# Patient Record
Sex: MALE | Race: WHITE | Employment: UNEMPLOYED | URBAN - METROPOLITAN AREA
[De-identification: names, ages, dates, MRNs, and addresses within clinical notes are randomized per-mention and may not be internally consistent; named-entity substitution may affect disease eponyms.]

---

## 2018-05-13 ENCOUNTER — HOSPITAL ENCOUNTER (OUTPATIENT)
Age: 33
Setting detail: OBSERVATION
LOS: 1 days | Discharge: HOME | End: 2018-05-15
Attending: EMERGENCY MEDICINE | Admitting: INTERNAL MEDICINE
Payer: SELF-PAY

## 2018-05-13 DIAGNOSIS — K92.2 UGIB (UPPER GASTROINTESTINAL BLEED): Primary | ICD-10-CM

## 2018-05-13 DIAGNOSIS — K92.1 MELENA: ICD-10-CM

## 2018-05-13 LAB
ALBUMIN SERPL-MCNC: 3.8 G/DL (ref 3.5–5)
ALBUMIN/GLOB SERPL: 1.3 {RATIO} (ref 1.2–3.5)
ALP SERPL-CCNC: 66 U/L (ref 50–136)
ALT SERPL-CCNC: 60 U/L (ref 12–65)
ANION GAP SERPL CALC-SCNC: 13 MMOL/L (ref 7–16)
AST SERPL-CCNC: 22 U/L (ref 15–37)
BASOPHILS # BLD: 0 K/UL (ref 0–0.2)
BASOPHILS NFR BLD: 0 % (ref 0–2)
BILIRUB SERPL-MCNC: 0.5 MG/DL (ref 0.2–1.1)
BUN SERPL-MCNC: 54 MG/DL (ref 6–23)
CALCIUM SERPL-MCNC: 8.4 MG/DL (ref 8.3–10.4)
CHLORIDE SERPL-SCNC: 103 MMOL/L (ref 98–107)
CO2 SERPL-SCNC: 25 MMOL/L (ref 21–32)
CREAT SERPL-MCNC: 1.15 MG/DL (ref 0.8–1.5)
DIFFERENTIAL METHOD BLD: ABNORMAL
EOSINOPHIL # BLD: 0.2 K/UL (ref 0–0.8)
EOSINOPHIL NFR BLD: 2 % (ref 0.5–7.8)
ERYTHROCYTE [DISTWIDTH] IN BLOOD BY AUTOMATED COUNT: 12 % (ref 11.9–14.6)
GLOBULIN SER CALC-MCNC: 3 G/DL (ref 2.3–3.5)
GLUCOSE SERPL-MCNC: 144 MG/DL (ref 65–100)
HCT VFR BLD AUTO: 34.2 % (ref 41.1–50.3)
HGB BLD-MCNC: 11.9 G/DL (ref 13.6–17.2)
IMM GRANULOCYTES # BLD: 0 K/UL (ref 0–0.5)
IMM GRANULOCYTES NFR BLD AUTO: 0 % (ref 0–5)
INR PPP: 1
LIPASE SERPL-CCNC: 81 U/L (ref 73–393)
LYMPHOCYTES # BLD: 3.3 K/UL (ref 0.5–4.6)
LYMPHOCYTES NFR BLD: 30 % (ref 13–44)
MCH RBC QN AUTO: 30 PG (ref 26.1–32.9)
MCHC RBC AUTO-ENTMCNC: 34.8 G/DL (ref 31.4–35)
MCV RBC AUTO: 86.1 FL (ref 79.6–97.8)
MONOCYTES # BLD: 0.6 K/UL (ref 0.1–1.3)
MONOCYTES NFR BLD: 5 % (ref 4–12)
NEUTS SEG # BLD: 6.8 K/UL (ref 1.7–8.2)
NEUTS SEG NFR BLD: 63 % (ref 43–78)
PLATELET # BLD AUTO: 367 K/UL (ref 150–450)
PMV BLD AUTO: 9.7 FL (ref 10.8–14.1)
POTASSIUM SERPL-SCNC: 3.8 MMOL/L (ref 3.5–5.1)
PROT SERPL-MCNC: 6.8 G/DL (ref 6.3–8.2)
PROTHROMBIN TIME: 13.2 SEC (ref 11.5–14.5)
RBC # BLD AUTO: 3.97 M/UL (ref 4.23–5.67)
SODIUM SERPL-SCNC: 141 MMOL/L (ref 136–145)
WBC # BLD AUTO: 10.9 K/UL (ref 4.3–11.1)

## 2018-05-13 PROCEDURE — 85025 COMPLETE CBC W/AUTO DIFF WBC: CPT | Performed by: EMERGENCY MEDICINE

## 2018-05-13 PROCEDURE — 80053 COMPREHEN METABOLIC PANEL: CPT | Performed by: EMERGENCY MEDICINE

## 2018-05-13 PROCEDURE — 99284 EMERGENCY DEPT VISIT MOD MDM: CPT | Performed by: EMERGENCY MEDICINE

## 2018-05-13 PROCEDURE — 85610 PROTHROMBIN TIME: CPT | Performed by: EMERGENCY MEDICINE

## 2018-05-13 PROCEDURE — 96361 HYDRATE IV INFUSION ADD-ON: CPT | Performed by: EMERGENCY MEDICINE

## 2018-05-13 PROCEDURE — 96375 TX/PRO/DX INJ NEW DRUG ADDON: CPT | Performed by: EMERGENCY MEDICINE

## 2018-05-13 PROCEDURE — 86900 BLOOD TYPING SEROLOGIC ABO: CPT | Performed by: EMERGENCY MEDICINE

## 2018-05-13 PROCEDURE — 74011250636 HC RX REV CODE- 250/636: Performed by: EMERGENCY MEDICINE

## 2018-05-13 PROCEDURE — 74011000250 HC RX REV CODE- 250: Performed by: EMERGENCY MEDICINE

## 2018-05-13 PROCEDURE — C9113 INJ PANTOPRAZOLE SODIUM, VIA: HCPCS | Performed by: EMERGENCY MEDICINE

## 2018-05-13 PROCEDURE — 83690 ASSAY OF LIPASE: CPT | Performed by: EMERGENCY MEDICINE

## 2018-05-13 PROCEDURE — 96374 THER/PROPH/DIAG INJ IV PUSH: CPT | Performed by: EMERGENCY MEDICINE

## 2018-05-13 RX ORDER — SODIUM CHLORIDE 0.9 % (FLUSH) 0.9 %
5-10 SYRINGE (ML) INJECTION EVERY 8 HOURS
Status: DISCONTINUED | OUTPATIENT
Start: 2018-05-13 | End: 2018-05-15 | Stop reason: HOSPADM

## 2018-05-13 RX ORDER — ONDANSETRON 2 MG/ML
4 INJECTION INTRAMUSCULAR; INTRAVENOUS
Status: COMPLETED | OUTPATIENT
Start: 2018-05-13 | End: 2018-05-13

## 2018-05-13 RX ORDER — SODIUM CHLORIDE 9 MG/ML
250 INJECTION, SOLUTION INTRAVENOUS AS NEEDED
Status: DISCONTINUED | OUTPATIENT
Start: 2018-05-13 | End: 2018-05-14

## 2018-05-13 RX ORDER — PAROXETINE 30 MG/1
30 TABLET, FILM COATED ORAL DAILY
COMMUNITY

## 2018-05-13 RX ORDER — SODIUM CHLORIDE 0.9 % (FLUSH) 0.9 %
5-10 SYRINGE (ML) INJECTION AS NEEDED
Status: DISCONTINUED | OUTPATIENT
Start: 2018-05-13 | End: 2018-05-15 | Stop reason: HOSPADM

## 2018-05-13 RX ADMIN — ONDANSETRON 4 MG: 2 INJECTION INTRAMUSCULAR; INTRAVENOUS at 23:16

## 2018-05-13 RX ADMIN — SODIUM CHLORIDE 1000 ML: 900 INJECTION, SOLUTION INTRAVENOUS at 23:16

## 2018-05-13 RX ADMIN — SODIUM CHLORIDE 40 MG: 9 INJECTION, SOLUTION INTRAMUSCULAR; INTRAVENOUS; SUBCUTANEOUS at 23:40

## 2018-05-13 NOTE — IP AVS SNAPSHOT
[address] 
[phone] Patient: Karie Dolan MRN: [id number] About your hospitalization You were admitted on:  May 14, 2018 You last received care in the:  [address] [place] [address] You were discharged on:  May 15, 2018 Why you were hospitalized Your primary diagnosis was:  Upper Gi Bleed Your diagnoses also included:  Anxiety, Add (Attention Deficit Disorder), Epigastric Abdominal Pain, Nausea And Vomiting Follow-up Information Follow up With Details Comments Contact Info Zac Skinner MD On 5/18/2018 12:30 [address] [facility] AT [address] [address] 
[phone] Fernando Barriga MD  OFFICE WILL CALL YOU FOR A FOLLOW UP EGD FOR 2-3 MONTHS 200 [address] [facility] [address] [facility] [address] 
[phone] Discharge Orders Procedure Order Date Status Priority Quantity Spec Type Associated Dx DISCHARGE INSTRUCTIONS 05/15/18 1058 Normal Routine 1 Comments: If worsened, call your doctor or return to hospital.  
When follow up with your doctor, make sure that your doctor is aware of this admission and review hospital record and follow up on lab results for continuity of care. Also, review your medications with your doctor for possible need of adjustment or refills. A check sarah indicates which time of day the medication should be taken. My Medications START taking these medications Instructions Each Dose to Equal  
 Morning Noon Evening Bedtime  
 pantoprazole 40 mg tablet Commonly known as:  PROTONIX Your next dose is:  TOMORROW Take 1 Tab by mouth daily for 30 days. 40 mg CONTINUE taking these medications Instructions Each Dose to Equal  
 Morning Noon Evening Bedtime PAXIL 30 mg tablet Generic drug:  PARoxetine Your next dose is:  TODAY Take 30 mg by mouth daily. 30 mg  
    
   
   
   
  
  
 VYVANSE 40 mg capsule Generic drug:  Lisdexamfetamine Your next dose is:  AS USUAL Take 40 mg by mouth daily. 40 mg Where to Get Your Medications Information on where to get these meds will be given to you by the nurse or doctor. ! Ask your nurse or doctor about these medications  
  pantoprazole 40 mg tablet Discharge Instructions DISCHARGE SUMMARY from Nurse The following personal items are in your possession at time of discharge: 
 
Dental Appliances: None Home Medications: None Jewelry: None Clothing: None Other Valuables: Cell Phone PATIENT INSTRUCTIONS: 
 
After general anesthesia or intravenous sedation, for 24 hours or while taking prescription Narcotics: · Limit your activities · Do not drive and operate hazardous machinery · Do not make important personal or business decisions · Do  not drink alcoholic beverages · If you have not urinated within 8 hours after discharge, please contact your surgeon on call. Report the following to your surgeon: 
· Excessive pain, swelling, redness or odor of or around the surgical area · Temperature over 100.5 · Nausea and vomiting lasting longer than 4 hours or if unable to take medications · Any signs of decreased circulation or nerve impairment to extremity: change in color, persistent  numbness, tingling, coldness or increase pain · Any questions What to do at Home: 
Recommended activity: Activity as tolerated, per MD 
 
If you experience any of the following symptoms fever>101, pain unrelieved with medication, nausea/vomiting, shortness of breath, dizziness/fainting, chest pain. , please follow up with your doctor. *  Please give a list of your current medications to your Primary Care Provider.  
 
*  Please update this list whenever your medications are discontinued, doses are 
 changed, or new medications (including over-the-counter products) are added. *  Please carry medication information at all times in case of emergency situations. These are general instructions for a healthy lifestyle: No smoking/ No tobacco products/ Avoid exposure to second hand smoke Surgeon General's Warning:  Quitting smoking now greatly reduces serious risk to your health. Obesity, smoking, and sedentary lifestyle greatly increases your risk for illness A healthy diet, regular physical exercise & weight monitoring are important for maintaining a healthy lifestyle You may be retaining fluid if you have a history of heart failure or if you experience any of the following symptoms:  Weight gain of 3 pounds or more overnight or 5 pounds in a week, increased swelling in our hands or feet or shortness of breath while lying flat in bed. Please call your doctor as soon as you notice any of these symptoms; do not wait until your next office visit. Recognize signs and symptoms of STROKE: 
 
F-face looks uneven A-arms unable to move or move unevenly S-speech slurred or non-existent T-time-call 911 as soon as signs and symptoms begin-DO NOT go Back to bed or wait to see if you get better-TIME IS BRAIN. Warning Signs of HEART ATTACK Call 911 if you have these symptoms: 
? Chest discomfort. Most heart attacks involve discomfort in the center of the chest that lasts more than a few minutes, or that goes away and comes back. It can feel like uncomfortable pressure, squeezing, fullness, or pain. ? Discomfort in other areas of the upper body. Symptoms can include pain or discomfort in one or both arms, the back, neck, jaw, or stomach. ? Shortness of breath with or without chest discomfort. ? Other signs may include breaking out in a cold sweat, nausea, or lightheadedness. Don't wait more than five minutes to call [address]!  Fast action can save your life. Calling 911 is almost always the fastest way to get lifesaving treatment. Emergency Medical Services staff can begin treatment when they arrive  up to an hour sooner than if someone gets to the hospital by car. The discharge information has been reviewed with the patient. The patient verbalized understanding. Discharge medications reviewed with the patient and appropriate educational materials and side effects teaching were provided. Upper GI Endoscopy: Before Your Procedure What is an upper GI endoscopy? An upper gastrointestinal (or GI) endoscopy is a test that allows your doctor to look at the inside of your esophagus, stomach, and the first part of your small intestine, called the duodenum. The esophagus is the tube that carries food to your stomach. The doctor uses a thin, lighted tube that bends. It is called an endoscope, or scope. The doctor puts the tip of the scope in your mouth and gently moves it down your throat. The scope is a flexible video camera. The doctor looks at a monitor (like a TV set or a computer screen) as he or she moves the scope. A doctor may do this test, which is also called a procedure, to look for ulcers, tumors, infection, or bleeding. It also can be used to look for signs of acid backing up into your esophagus. This is called gastroesophageal reflux disease, or GERD. The doctor can use the scope to take a sample of tissue for study (a biopsy). The doctor also can use the scope to take out growths or stop bleeding. Follow-up care is a key part of your treatment and safety. Be sure to make and go to all appointments, and call your doctor if you are having problems. It's also a good idea to know your test results and keep a list of the medicines you take. What happens before the procedure? ?Preparing for the procedure ? · Understand exactly what procedure is planned, along with the risks, benefits, and other options. · Tell your doctors ALL the medicines, vitamins, supplements, and herbal remedies you take. Some of these can increase the risk of bleeding or interact with anesthesia. ? · If you take blood thinners, such as warfarin (Coumadin), clopidogrel (Plavix), or aspirin, be sure to talk to your doctor. He or she will tell you if you should stop taking these medicines before your procedure. Make sure that you understand exactly what your doctor wants you to do.  
? · Your doctor will tell you which medicines to take or stop before your procedure. You may need to stop taking certain medicines a week or more before the procedure. So talk to your doctor as soon as you can.  
? · If you have an advance directive, let your doctor know. It may include a living will and a durable power of  for health care. Bring a copy to the hospital. If you don't have one, you may want to prepare one. It lets your doctor and loved ones know your health care wishes. Doctors advise that everyone prepare these papers before any type of surgery or procedure. Procedures can be stressful. This information will help you understand what you can expect. And it will help you safely prepare for your procedure. What happens on the day of the procedure? · Follow the instructions exactly about when to stop eating and drinking. If you don't, your procedure may be canceled. If your doctor told you to take your medicines on the day of the procedure, take them with only a sip of water. ? · Take a bath or shower before you come in for your procedure. Do not apply lotions, perfumes, deodorants, or nail polish. ? · Take off all jewelry and piercings. And take out contact lenses, if you wear them. ? At the hospital or surgery center · Bring a picture ID. ? · The test may take 15 to 30 minutes. ? · The doctor may spray medicine on the back of your throat to numb it. You also will get medicine to prevent pain and to relax you. ? · You will lie on your left side. The doctor will put the scope in your mouth and toward the back of your throat. The doctor will tell you when to swallow. This helps the scope move down your throat. You will be able to breathe normally. The doctor will move the scope down your esophagus into your stomach. The doctor also may look at the duodenum. ? · If your doctor wants to take a sample of tissue for a biopsy, he or she may use small surgical tools, which are put into the scope, to cut off some tissue. You will not feel a biopsy, if one is taken. The doctor also can use the tools to stop bleeding or to do other treatments, if needed. ? · You will stay at the hospital or surgery center for 1 to 2 hours until the medicine you were given wears off. Going home · Be sure you have someone to drive you home. Anesthesia and pain medicine make it unsafe for you to drive. ? · You will be given more specific instructions about recovering from your procedure. They will cover things like diet, wound care, follow-up care, driving, and getting back to your normal routine. When should you call your doctor? · You have questions or concerns. ? · You don't understand how to prepare for your procedure. ? · You become ill before the procedure (such as fever, flu, or a cold). ? · You need to reschedule or have changed your mind about having the procedure. Where can you learn more? Go to http://john-michael.info/. Enter P790 in the search box to learn more about \"Upper GI Endoscopy: Before Your Procedure. \" Current as of: May 12, 2017 Content Version: 11.4 © 4297-6979 [employer]. Care instructions adapted under license by [employer] (which disclaims liability or warranty for this information).  If you have questions about a medical condition or this instruction, always ask your healthcare professional. [employer] disclaims any warranty or liability for your use of this information. Zillow Announcement We are excited to announce that we are making your provider's discharge notes available to you in Zillow. You will see these notes when they are completed and signed by the physician that discharged you from your recent hospital stay. If you have any questions or concerns about any information you see in Zillow, please call the Health Information Department where you were seen or reach out to your Primary Care Provider for more information about your plan of care. Introducing [address]! [address] introduces Zillow patient portal. Now you can access parts of your medical record, email your doctor's office, and request medication refills online. 1. In your internet browser, go to [email or web address] 2. Click on the First Time User? Click Here link in the Sign In box. You will see the New Member Sign Up page. 3. Enter your Zillow Access Code exactly as it appears below. You will not need to use this code after youve completed the sign-up process. If you do not sign up before the expiration date, you must request a new code. · Zillow Access Code: [email or web address] Expires: 8/11/2018 10:49 PM 
 
4. Enter the last four digits of your Social Security Number (xxxx) and Date of Birth (mm/dd/yyyy) as indicated and click Submit. You will be taken to the next sign-up page. 5. Create a Zillow ID. This will be your Zillow login ID and cannot be changed, so think of one that is secure and easy to remember. 6. Create a Zillow password. You can change your password at any time. 7. Enter your Password Reset Question and Answer. This can be used at a later time if you forget your password. 8. Enter your e-mail address. You will receive e-mail notification when new information is available in [address] 9. Click Sign Up. You can now view and download portions of your medical record. 10. Click the Download Summary menu link to download a portable copy of your medical information. If you have questions, please visit the Frequently Asked Questions section of the [employer] website. Remember, [employer] is NOT to be used for urgent needs. For medical emergencies, dial 911. Now available from your iPhone and Android! Introducing Jose Francisco Cutler As a [facility] patient, I wanted to make you aware of our electronic visit tool called Jose Francisco Cutler. Jack Robie 24/7 allows you to connect within minutes with a medical provider 24 hours a day, seven days a week via a mobile device or tablet or logging into a secure website from your computer. You can access Jose Francisco Cutler from anywhere in the [place]. A virtual visit might be right for you when you have a simple condition and feel like you just dont want to get out of bed, or cant get away from work for an appointment, when your regular [facility] provider is not available (evenings, weekends or holidays), or when youre out of town and need minor care. Electronic visits cost only $49 and if the Jack Robie 24/7 provider determines a prescription is needed to treat your condition, one can be electronically transmitted to a nearby pharmacy*. Please take a moment to enroll today if you have not already done so. The enrollment process is free and takes just a few minutes. To enroll, please download the Jack Robie 24/7 juanita to your tablet or phone, or visit [email or web address] to enroll on your computer. And, as an [address] [place] [address] patient with a Elli account, the results of your visits will be scanned into your electronic medical record and your primary care provider will be able to view the scanned results.    
We urge you to continue to see your regular [facility] provider for your ongoing medical care. And while your primary care provider may not be the one available when you seek a Jose Francisco Cutler virtual visit, the peace of mind you get from getting a real diagnosis real time can be priceless. For more information on Jose Francisco Pollarddeborah, view our Frequently Asked Questions (FAQs) at [email or web address]. Sincerely, 
 
Kye Burgos MD 
Chief Medical Officer [employer] *:  certain medications cannot be prescribed via Jose Francisco Cutler Providers Seen During Your Hospitalization Provider Specialty Primary office phone [address] MD Emergency Medicine [phone] Marcel Hashimoto, MD Internal Medicine [phone] Your Primary Care Physician (PCP) Primary Care Physician Office Phone Office Fax NOT ON FILE ** None ** ** None ** You are allergic to the following Allergen Reactions Seafood Other (comments) Tingling and skin irritation Recent Documentation Smoking Status Never Smoker Emergency Contacts Name Discharge Info Relation Home Work Mobile Helen Reddy  Girlfriend [18] [phone] Patient Belongings The following personal items are in your possession at time of discharge: 
  Dental Appliances: None         Home Medications: None   Jewelry: None  Clothing: None    Other Valuables: Cell Phone Please provide this summary of care documentation to your next provider. Signatures-by signing, you are acknowledging that this After Visit Summary has been reviewed with you and you have received a copy. Patient Signature:  ____________________________________________________________ Date:  ____________________________________________________________  
  
Lindsay Pena Provider Signature:  ____________________________________________________________ Date:  ____________________________________________________________

## 2018-05-14 ENCOUNTER — ANESTHESIA (OUTPATIENT)
Dept: ENDOSCOPY | Age: 33
End: 2018-05-14
Payer: SELF-PAY

## 2018-05-14 ENCOUNTER — ANESTHESIA EVENT (OUTPATIENT)
Dept: ENDOSCOPY | Age: 33
End: 2018-05-14
Payer: SELF-PAY

## 2018-05-14 PROBLEM — F41.9 ANXIETY: Status: ACTIVE | Noted: 2018-05-14

## 2018-05-14 PROBLEM — R11.2 NAUSEA AND VOMITING: Status: ACTIVE | Noted: 2018-05-14

## 2018-05-14 PROBLEM — F98.8 ADD (ATTENTION DEFICIT DISORDER): Status: ACTIVE | Noted: 2018-05-14

## 2018-05-14 PROBLEM — R10.13 EPIGASTRIC ABDOMINAL PAIN: Status: ACTIVE | Noted: 2018-05-14

## 2018-05-14 PROBLEM — K92.2 UPPER GI BLEED: Status: ACTIVE | Noted: 2018-05-14

## 2018-05-14 LAB
ABO + RH BLD: NORMAL
BLOOD GROUP ANTIBODIES SERPL: NORMAL
HCT VFR BLD AUTO: 25.1 % (ref 41.1–50.3)
HCT VFR BLD AUTO: 27.1 % (ref 41.1–50.3)
HCT VFR BLD AUTO: 27.8 % (ref 41.1–50.3)
HGB BLD-MCNC: 8.4 G/DL (ref 13.6–17.2)
HGB BLD-MCNC: 9.2 G/DL (ref 13.6–17.2)
HGB BLD-MCNC: 9.4 G/DL (ref 13.6–17.2)
SPECIMEN EXP DATE BLD: NORMAL

## 2018-05-14 PROCEDURE — 99218 HC RM OBSERVATION: CPT

## 2018-05-14 PROCEDURE — 96375 TX/PRO/DX INJ NEW DRUG ADDON: CPT

## 2018-05-14 PROCEDURE — 74011250637 HC RX REV CODE- 250/637: Performed by: INTERNAL MEDICINE

## 2018-05-14 PROCEDURE — 96376 TX/PRO/DX INJ SAME DRUG ADON: CPT

## 2018-05-14 PROCEDURE — C9113 INJ PANTOPRAZOLE SODIUM, VIA: HCPCS | Performed by: INTERNAL MEDICINE

## 2018-05-14 PROCEDURE — 74011250636 HC RX REV CODE- 250/636

## 2018-05-14 PROCEDURE — 74011250636 HC RX REV CODE- 250/636: Performed by: INTERNAL MEDICINE

## 2018-05-14 PROCEDURE — 74011000250 HC RX REV CODE- 250: Performed by: INTERNAL MEDICINE

## 2018-05-14 PROCEDURE — 76040000025: Performed by: INTERNAL MEDICINE

## 2018-05-14 PROCEDURE — 77030010936 HC CLP LIG BSC -C: Performed by: INTERNAL MEDICINE

## 2018-05-14 PROCEDURE — 36415 COLL VENOUS BLD VENIPUNCTURE: CPT | Performed by: INTERNAL MEDICINE

## 2018-05-14 PROCEDURE — 74011250636 HC RX REV CODE- 250/636: Performed by: EMERGENCY MEDICINE

## 2018-05-14 PROCEDURE — 85018 HEMOGLOBIN: CPT | Performed by: INTERNAL MEDICINE

## 2018-05-14 PROCEDURE — 76060000031 HC ANESTHESIA FIRST 0.5 HR: Performed by: INTERNAL MEDICINE

## 2018-05-14 RX ORDER — SODIUM CHLORIDE 0.9 % (FLUSH) 0.9 %
5-10 SYRINGE (ML) INJECTION EVERY 8 HOURS
Status: DISCONTINUED | OUTPATIENT
Start: 2018-05-14 | End: 2018-05-14

## 2018-05-14 RX ORDER — ONDANSETRON 2 MG/ML
4 INJECTION INTRAMUSCULAR; INTRAVENOUS
Status: DISCONTINUED | OUTPATIENT
Start: 2018-05-14 | End: 2018-05-15 | Stop reason: HOSPADM

## 2018-05-14 RX ORDER — PAROXETINE HYDROCHLORIDE 20 MG/1
30 TABLET, FILM COATED ORAL
Status: DISCONTINUED | OUTPATIENT
Start: 2018-05-14 | End: 2018-05-15 | Stop reason: HOSPADM

## 2018-05-14 RX ORDER — SODIUM CHLORIDE, SODIUM LACTATE, POTASSIUM CHLORIDE, CALCIUM CHLORIDE 600; 310; 30; 20 MG/100ML; MG/100ML; MG/100ML; MG/100ML
INJECTION, SOLUTION INTRAVENOUS
Status: DISCONTINUED | OUTPATIENT
Start: 2018-05-14 | End: 2018-05-14 | Stop reason: HOSPADM

## 2018-05-14 RX ORDER — PROPOFOL 10 MG/ML
INJECTION, EMULSION INTRAVENOUS AS NEEDED
Status: DISCONTINUED | OUTPATIENT
Start: 2018-05-14 | End: 2018-05-14 | Stop reason: HOSPADM

## 2018-05-14 RX ORDER — SODIUM CHLORIDE 9 MG/ML
100 INJECTION, SOLUTION INTRAVENOUS CONTINUOUS
Status: DISCONTINUED | OUTPATIENT
Start: 2018-05-14 | End: 2018-05-15 | Stop reason: HOSPADM

## 2018-05-14 RX ORDER — PROPOFOL 10 MG/ML
INJECTION, EMULSION INTRAVENOUS
Status: DISCONTINUED | OUTPATIENT
Start: 2018-05-14 | End: 2018-05-14 | Stop reason: HOSPADM

## 2018-05-14 RX ORDER — HYDROCODONE BITARTRATE AND ACETAMINOPHEN 5; 325 MG/1; MG/1
1 TABLET ORAL
Status: DISCONTINUED | OUTPATIENT
Start: 2018-05-14 | End: 2018-05-15 | Stop reason: HOSPADM

## 2018-05-14 RX ORDER — SODIUM CHLORIDE 0.9 % (FLUSH) 0.9 %
5-10 SYRINGE (ML) INJECTION AS NEEDED
Status: DISCONTINUED | OUTPATIENT
Start: 2018-05-14 | End: 2018-05-14

## 2018-05-14 RX ORDER — ACETAMINOPHEN 325 MG/1
650 TABLET ORAL
Status: DISCONTINUED | OUTPATIENT
Start: 2018-05-14 | End: 2018-05-15 | Stop reason: HOSPADM

## 2018-05-14 RX ADMIN — PROPOFOL 125 MG: 10 INJECTION, EMULSION INTRAVENOUS at 12:41

## 2018-05-14 RX ADMIN — SODIUM CHLORIDE, SODIUM LACTATE, POTASSIUM CHLORIDE, CALCIUM CHLORIDE: 600; 310; 30; 20 INJECTION, SOLUTION INTRAVENOUS at 12:37

## 2018-05-14 RX ADMIN — HYDROCODONE BITARTRATE AND ACETAMINOPHEN 1 TABLET: 5; 325 TABLET ORAL at 02:37

## 2018-05-14 RX ADMIN — Medication 5 ML: at 22:12

## 2018-05-14 RX ADMIN — SODIUM CHLORIDE 40 MG: 9 INJECTION, SOLUTION INTRAMUSCULAR; INTRAVENOUS; SUBCUTANEOUS at 22:02

## 2018-05-14 RX ADMIN — Medication 10 ML: at 02:01

## 2018-05-14 RX ADMIN — PAROXETINE 30 MG: 20 TABLET, FILM COATED ORAL at 02:06

## 2018-05-14 RX ADMIN — Medication 10 ML: at 14:09

## 2018-05-14 RX ADMIN — FAMOTIDINE 20 MG: 10 INJECTION INTRAVENOUS at 03:06

## 2018-05-14 RX ADMIN — SODIUM CHLORIDE 250 ML: 900 INJECTION, SOLUTION INTRAVENOUS at 00:50

## 2018-05-14 RX ADMIN — SODIUM CHLORIDE 100 ML/HR: 900 INJECTION, SOLUTION INTRAVENOUS at 01:51

## 2018-05-14 RX ADMIN — PROPOFOL 100 MCG/KG/MIN: 10 INJECTION, EMULSION INTRAVENOUS at 12:41

## 2018-05-14 RX ADMIN — SODIUM CHLORIDE 40 MG: 9 INJECTION, SOLUTION INTRAMUSCULAR; INTRAVENOUS; SUBCUTANEOUS at 07:49

## 2018-05-14 RX ADMIN — ONDANSETRON 4 MG: 2 INJECTION INTRAMUSCULAR; INTRAVENOUS at 02:38

## 2018-05-14 NOTE — H&P
HOSPITALIST H&P      NAME:  Shivam Gonsalez   Age:  33 y.o.  :   1985   MRN:   [id number]    PCP: Not On File [facility]    Attending MD: Liana Norris MD    Treatment Team: Attending Provider: Marita Castro MD; Primary Nurse: Brooke Spears RN    HPI:     Shivam Gonsalez is a 88XDM with eosinophilic esophagitis, IgA deficiency who presented to [facility] ED with n/v and melena that started this morning. Has had about 3 episodes of melenic stool and about 4 episodes of coffee ground emesis. Has been unable to keep any po down. He reports associated dull, constant epigastric abd pain without radiation. Has had some LH/dizziness when trying to stand up twice today. No CP, SOB. Last EGD was 2 yrs ago at [facility], but he is unsure which GI doctor he previously saw. No hx of GI bleed. Esophagitis and reflux symptoms were better when taking prilosec daily, but weaned himself off over a year ago. Takes NSAIDs rarely, last took an aleve about 3 days ago. Has hx of heavy ETOH, but has cut back over last two months and now only drinks on the weekend. No hx of withdrawal. In the ED, Hgb was 11.5, most recent at [facility] in  was 16.1. Hemodynamically stable. Hospitalist asked to admit for UGIB. Complete ROS done and is as stated in HPI or otherwise negative    Past Medical History:   Diagnosis Date    ADD (attention deficit disorder)     Anxiety     Asthma     Depression     Eosinophilic esophagitis     Esophageal dysphagia     IgA deficiency (HCC)         Past Surgical History:   Procedure Laterality Date    HX TONSILLECTOMY      HX WISDOM TEETH EXTRACTION          Prior to Admission Medications   Prescriptions Last Dose Informant Patient Reported? Taking? Lisdexamfetamine (VYVANSE) 40 mg capsule 2018 at Unknown time  Yes Yes   Sig: Take 40 mg by mouth daily. PARoxetine (PAXIL) 30 mg tablet 2018 at Unknown time  Yes Yes   Sig: Take 30 mg by mouth daily.       Facility-Administered Medications: None Allergies   Allergen Reactions    Seafood Other (comments)     Tingling and skin irritation        Social History   Substance Use Topics    Smoking status: Never Smoker    Smokeless tobacco: Not on file    Alcohol use Yes      Comment: beer 12 pk daily at times, avg 3-4 daily        No family history on file. Objective:       Visit Vitals    /72    Pulse 82    SpO2 100%        No data recorded. Oxygen Therapy  O2 Sat (%): 100 % (05/14/18 0030)  Pulse via Oximetry: 82 beats per minute (05/14/18 0030)      Physical Exam:      General:    Alert, cooperative, NAD   Eyes:   EOMI Anicteric  Head:   Normocephalic, without obvious abnormality, atraumatic. ENT:  MMM  Lungs:   Clear, no wheezing  Heart:   Regular rate and rhythm,  no BLE edema  Abdomen:   Soft, ND, mild epigastric tenderness, +NABS  MSK:  Spontaneously moves extremities. No deformities/lesions. Skin:     Texture, turgor normal. Not Jaundiced. Neurologic: Alert and oriented x 3, no focal deficits   Psychiatry:      No depression/anxiety. Mood congruent for context. Heme/Lymph/Immune: No petechiae, echymoses, overt signs of bleeding or lymphadenopathy. Data Review:   Recent Results (from the past 24 hour(s))   CBC WITH AUTOMATED DIFF    Collection Time: 05/13/18 11:11 PM   Result Value Ref Range    WBC 10.9 4.3 - 11.1 K/uL    RBC 3.97 (L) 4.23 - 5.67 M/uL    HGB 11.9 (L) 13.6 - 17.2 g/dL    HCT 34.2 (L) 41.1 - 50.3 %    MCV 86.1 79.6 - 97.8 FL    MCH 30.0 26.1 - 32.9 PG    MCHC 34.8 31.4 - 35.0 g/dL    RDW 12.0 11.9 - 14.6 %    PLATELET 028 212 - 637 K/uL    MPV 9.7 (L) 10.8 - 14.1 FL    DF AUTOMATED      NEUTROPHILS 63 43 - 78 %    LYMPHOCYTES 30 13 - 44 %    MONOCYTES 5 4.0 - 12.0 %    EOSINOPHILS 2 0.5 - 7.8 %    BASOPHILS 0 0.0 - 2.0 %    IMMATURE GRANULOCYTES 0 0.0 - 5.0 %    ABS. NEUTROPHILS 6.8 1.7 - 8.2 K/UL    ABS. LYMPHOCYTES 3.3 0.5 - 4.6 K/UL    ABS. MONOCYTES 0.6 0.1 - 1.3 K/UL    ABS.  EOSINOPHILS 0.2 0.0 - 0.8 K/UL    ABS. BASOPHILS 0.0 0.0 - 0.2 K/UL    ABS. IMM. GRANS. 0.0 0.0 - 0.5 K/UL   METABOLIC PANEL, COMPREHENSIVE    Collection Time: 05/13/18 11:11 PM   Result Value Ref Range    Sodium 141 136 - 145 mmol/L    Potassium 3.8 3.5 - 5.1 mmol/L    Chloride 103 98 - 107 mmol/L    CO2 25 21 - 32 mmol/L    Anion gap 13 7 - 16 mmol/L    Glucose 144 (H) 65 - 100 mg/dL    BUN 54 (H) 6 - 23 MG/DL    Creatinine 1.15 0.8 - 1.5 MG/DL    GFR est AA >60 >60 ml/min/1.73m2    GFR est non-AA >60 >60 ml/min/1.73m2    Calcium 8.4 8.3 - 10.4 MG/DL    Bilirubin, total 0.5 0.2 - 1.1 MG/DL    ALT (SGPT) 60 12 - 65 U/L    AST (SGOT) 22 15 - 37 U/L    Alk. phosphatase 66 50 - 136 U/L    Protein, total 6.8 6.3 - 8.2 g/dL    Albumin 3.8 3.5 - 5.0 g/dL    Globulin 3.0 2.3 - 3.5 g/dL    A-G Ratio 1.3 1.2 - 3.5     TYPE & SCREEN    Collection Time: 05/13/18 11:11 PM   Result Value Ref Range    Crossmatch Expiration 05/16/2018     ABO/Rh(D) Karon Brooke POSITIVE     Antibody screen NEG    PROTHROMBIN TIME + INR    Collection Time: 05/13/18 11:11 PM   Result Value Ref Range    Prothrombin time 13.2 11.5 - 14.5 sec    INR 1.0     LIPASE    Collection Time: 05/13/18 11:11 PM   Result Value Ref Range    Lipase 81 73 - 393 U/L       Imaging /Procedures /Studies   No orders to display       Assessment and Plan:        Active Hospital Problems    Diagnosis Date Noted    Upper GI bleed 05/14/2018    Anxiety 05/14/2018    ADD (attention deficit disorder) 05/14/2018    Epigastric abdominal pain 05/14/2018    Nausea and vomiting 05/14/2018       PLAN  - admit to OBS    - Upper GIB:  Hgb q8h, tx if < 8  Continue IVF and PPI BID IV  GI consult in AM  NPO in case can do EGD tomorrow    - abd pain/n/v:  Likely 2/2 UGIB  Continue IVF, anti-emetics    - Anxiety/depression:  Continue paxil    - ADD:  Holding vyvanse while inpatient    Code Status: FULL  DVT Prophylaxis: none needed, patient is ambulatory    Anticipated discharge: 1-2 days      Jabari Stanley MD  12:08 AM

## 2018-05-14 NOTE — ANESTHESIA POSTPROCEDURE EVALUATION
Post-Anesthesia Evaluation and Assessment    Patient: Tomas Rich MRN: [id number]  SSN: [id number]    YOB: 1985  Age: 33 y.o. Sex: male       Cardiovascular Function/Vital Signs  Visit Vitals    /56    Pulse 73    Temp 36.7 °C (98.1 °F)    Resp 18    SpO2 96%       Patient is status post total IV anesthesia anesthesia for Procedure(s):  ESOPHAGOGASTRODUODENOSCOPY (EGD)  RESOLUTION CLIP. Nausea/Vomiting: None    Postoperative hydration reviewed and adequate. Pain:  Pain Scale 1: Visual (05/14/18 1306)  Pain Intensity 1: 0 (05/14/18 1306)   Managed    Neurological Status: At baseline    Mental Status and Level of Consciousness: Arousable    Pulmonary Status:   O2 Device: Room air (05/14/18 1311)   Adequate oxygenation and airway patent    Complications related to anesthesia: None    Post-anesthesia assessment completed.  No concerns    Signed By: Kj Bonilla MD     May 14, 2018

## 2018-05-14 NOTE — PROCEDURES
Esophagogastroduodenoscopy (EGD) Procedure Note    Procedure: EGD    Pre-operative Diagnosis: UGI bleed     Post-operative Diagnosis: Ulceration at GEJ vs MWT, (history inconsistent with the latter)   Resolution Clip x 2   Hiatal hernia, small   Eosinophillic esophagitis. Recommendations:  See inpt notes. Follow up:   Inpt. Plan to repeat EGD in 3 months    Anesthesia/Sedation:  MAC, (see separate report). Procedure Details:  Informed consent was obtained for the procedure, including sedation. Risks of perforation, hemorrhage, adverse drug reaction and aspiration were discussed. The patient was placed in the left lateral decubitus position. Based on the pre-procedure assessment, including review of the patient's medical history, medications, allergies, and review of systems, he had been deemed to be an appropriate candidate for anesthesia. The patient was monitored continuously with ECG tracing, pulse oximetry, blood pressure monitoring, and direct observations. Please refer to the anesthesia record for details and dosages of medications. The esophagus was intubated with direct visualization. The esophagus, stomach and duodenum were traversed to the full extent of the endoscope. Retroflexed views of the cardia and fundus were performed. The stomach was fully insufflated and deflated. Findings:   OROPHARYNX: The oropharynx was briefly viewed on entry and withdrawal with very brief evaluation of the cords, arytenoids and pyriform sinuses. No abnormalities found. ESOPHAGUS:  The esophagus was remarkable for horizontal rings and linear furrows c/w EoE but no significant narrowing appreciated. There was an adherent clot over an elliptical ulceration at the EGJ. No apparent chronic esophagitis and the appearance was of a MWT, however he had no recall of retching prior to onset of melena.   I removed the clot with a clip and placed two clips, deeply seated, on the base of the clotted area.  Small hiatal hernia noted. STOMACH: The gastric pouch inflated and deflated normally. The mucosal surface and gastric rugae were normal without erosions, ulcerations, raised lesions, vascular ectasias or other anomalies. The pylorus was patent to passage of the endoscope without difficulty. DUODENUM:  The endoscope was easily passed into the third section of the duodenum. The villous mucosa was normal without blunting or scalloped folds. There were no mucosal erosions, ulcerations, raised lesions or vascular ectasias. EBL: 0 cc's. Pathology speciment:  None. Complications: No apparent complications and the patient tolerated sedation and the procedure well. Attending Attestation: I performed the procedure.     Bryson Pascal MD

## 2018-05-14 NOTE — PROGRESS NOTES
TRANSFER - OUT REPORT:    Verbal report given to Xander Holland RN on Ruby Carreno  being transferred to room 359 for routine progression of care       Report consisted of patients Situation, Background, Assessment and   Recommendations(SBAR). Information from the following report(s) SBAR, Procedure Summary and MAR was reviewed with the receiving nurse. Lines:   Peripheral IV 05/13/18 Right Antecubital (Active)   Site Assessment Clean, dry, & intact 5/14/2018  7:51 AM   Phlebitis Assessment 0 5/14/2018  7:51 AM   Infiltration Assessment 0 5/14/2018  7:51 AM   Dressing Status Clean, dry, & intact 5/14/2018  7:51 AM   Dressing Type Tape;Transparent 5/14/2018  7:51 AM   Hub Color/Line Status Green; Infusing;Flushed;Patent 5/14/2018  7:51 AM        Opportunity for questions and clarification was provided.       Patient transported with:

## 2018-05-14 NOTE — PROGRESS NOTES
TRANSFER - IN REPORT:    Verbal report received from LUI Billings on Porsche Markham  being received from PACU for routine post - op      Report consisted of patients Situation, Background, Assessment and   Recommendations(SBAR). Information from the following report(s) SBAR and Kardex was reviewed with the receiving nurse. Opportunity for questions and clarification was provided. Assessment will be completed upon patients arrival to unit and care will be assumed.

## 2018-05-14 NOTE — ED NOTES
Pt presents to ER for black melanic stool since 1000 this am as well as coffee ground emesis. Pt noted to be pale and very dizzy, diaphoretic upon ambulation w/ increased dizziness. He reports epigastric pain as well.

## 2018-05-14 NOTE — PROGRESS NOTES
TRANSFER - OUT REPORT:    Verbal report given to LUI Greenberg on Bryson Hui  being transferred to Pre op for ordered procedure       Report consisted of patients Situation, Background, Assessment and   Recommendations(SBAR). Information from the following report(s) SBAR, Kardex, Intake/Output and Recent Results was reviewed with the receiving nurse. Lines:   Peripheral IV 05/13/18 Right Antecubital (Active)   Site Assessment Clean, dry, & intact 5/14/2018  7:51 AM   Phlebitis Assessment 0 5/14/2018  7:51 AM   Infiltration Assessment 0 5/14/2018  7:51 AM   Dressing Status Clean, dry, & intact 5/14/2018  7:51 AM   Dressing Type Tape;Transparent 5/14/2018  7:51 AM   Hub Color/Line Status Green; Infusing;Flushed;Patent 5/14/2018  7:51 AM        Opportunity for questions and clarification was provided.       Patient transported with:   [employer]

## 2018-05-14 NOTE — PERIOP NOTES
TRANSFER - IN REPORT:    Verbal report received from [facility] on Claudetta Laroche  being received from 3rd floor med surg for routine progression of care      Report consisted of patients Situation, Background, Assessment and   Recommendations(SBAR). Information from the following report(s) Kardex and MAR was reviewed with the receiving nurse. Opportunity for questions and clarification was provided. Assessment completed upon patients arrival to unit and care assumed.

## 2018-05-14 NOTE — PROGRESS NOTES
Admission Note - Patient is alert and oriented x4. Patient admitted for observation due to apparent GI bleed. Patient is NPO except for ice chips. Ambulates ad yadi and Significant Other is at bedside. Bloody emesis and diarrhea. Complaint of severe heartburn. MD notified. New orders placed. Currently lying in a low, locked bed. Dual skin assessment done with Mario Chavira RN  No visible signs of skin breakdown. Skin is intact.

## 2018-05-14 NOTE — ED PROVIDER NOTES
HPI Comments: 61-year-old male with history of eosinophilic esophagitis, vitamin A deficiency presents with complaint of cramping epigastric pain with associated nausea, vomiting, and 2 episodes of dark tarry stools since 7:30 this morning. Denies history of previous upper GI bleed. States she is followed by GI at [facility]. States he underwent EGD around 2 years ago. States that ever reported in March he was drinking excessively. States he has stopped drinking excessively as of recently. Denies fever, chills, dysuria, hematuria, chest pain, shortness of breath, hematemesis, hemoptysis. Reports generalized weakness and fatigue. Patient is a 33 y.o. male presenting with melena and vomiting. The history is provided by the patient. No  was used. Melena    The current episode started today. The onset was sudden. The problem occurs rarely. The problem has been unchanged. The pain is moderate. Associated symptoms include abdominal pain, diarrhea, nausea and vomiting. Pertinent negatives include no anorexia, no fever, no hemorrhoids, no rectal pain, no hematuria, no chest pain, no headaches, no coughing, no difficulty breathing and no rash. Vomiting    Associated symptoms include abdominal pain and diarrhea. Pertinent negatives include no chills, no fever, no headaches, no myalgias, no cough and no headaches. Past Medical History:   Diagnosis Date    ADD (attention deficit disorder)     Anxiety     Asthma     Depression     Eosinophilic esophagitis     Esophageal dysphagia     IgA deficiency (HCC)        Past Surgical History:   Procedure Laterality Date    HX TONSILLECTOMY      HX WISDOM TEETH EXTRACTION           No family history on file. Social History     Social History    Marital status: SINGLE     Spouse name: N/A    Number of children: N/A    Years of education: N/A     Occupational History    Not on file.      Social History Main Topics    Smoking status: Never Smoker    Smokeless tobacco: Not on file    Alcohol use Yes      Comment: beer 12 pk daily at times, avg 3-4 daily    Drug use: No    Sexual activity: Not on file     Other Topics Concern    Not on file     Social History Narrative    No narrative on file         ALLERGIES: Seafood    Review of Systems   Constitutional: Negative for chills and fever. HENT: Negative for congestion, facial swelling and sore throat. Eyes: Negative for pain, redness and visual disturbance. Respiratory: Negative for cough and shortness of breath. Cardiovascular: Negative for chest pain, palpitations and leg swelling. Gastrointestinal: Positive for abdominal pain, diarrhea, melena, nausea and vomiting. Negative for anorexia, constipation, hemorrhoids and rectal pain. Endocrine: Negative for polydipsia and polyphagia. Genitourinary: Negative for dysuria and hematuria. Musculoskeletal: Negative for back pain, joint swelling, myalgias and neck pain. Skin: Negative for pallor, rash and wound. Neurological: Negative for dizziness, weakness, numbness and headaches. Psychiatric/Behavioral: Negative for agitation and confusion. Vitals:    05/13/18 2316 05/13/18 2318 05/13/18 2330 05/13/18 2358   BP: 121/68  123/73    Pulse:  87 79 76   SpO2:  99% 100% 100%            Physical Exam   Constitutional: He is oriented to person, place, and time. He appears well-developed and well-nourished. HENT:   Head: Normocephalic. Mouth/Throat: Oropharynx is clear and moist. No oropharyngeal exudate. Pale mucous membranes. Eyes: Conjunctivae and EOM are normal. Pupils are equal, round, and reactive to light. Neck: Normal range of motion. No JVD present. No tracheal deviation present. Cardiovascular: Normal rate, regular rhythm, normal heart sounds and intact distal pulses. Radial pulses 2+ bilaterally. Pulmonary/Chest: Effort normal and breath sounds normal.   CTAB. Abdominal: Soft.  Bowel sounds are normal. There is tenderness. There is no rebound and no guarding. Moderate epigastric TTP. No rebound or guarding noted. Genitourinary:   Genitourinary Comments: Pt with melanotic stool at bedside; guaiac positive. Denies rectal exam.  Denies hemorrhoids or anal fissures. Musculoskeletal: Normal range of motion. He exhibits no edema or tenderness. Neurological: He is alert and oriented to person, place, and time. No cranial nerve deficit. Coordination normal.   Skin: Skin is warm and dry. There is pallor. Nursing note and vitals reviewed. MDM  Number of Diagnoses or Management Options  Melena: new and requires workup  UGIB (upper gastrointestinal bleed): new and requires workup  Diagnosis management comments: Patient admitted hemodyanamically stable. H&H stable. Patient eventually Zofran, Protonix, IV fluid bolus. Hospitalist consulted for admission.    GI to be consulted by Hospitalist service for likely scope in am.       Amount and/or Complexity of Data Reviewed  Clinical lab tests: ordered and reviewed  Tests in the medicine section of CPT®: reviewed and ordered  Review and summarize past medical records: yes  Independent visualization of images, tracings, or specimens: yes    Risk of Complications, Morbidity, and/or Mortality  Presenting problems: moderate  Diagnostic procedures: moderate  Management options: moderate    Patient Progress  Patient progress: other (comment) (Guarded )        ED Course       Procedures      Results Include:    Recent Results (from the past 24 hour(s))   CBC WITH AUTOMATED DIFF    Collection Time: 05/13/18 11:11 PM   Result Value Ref Range    WBC 10.9 4.3 - 11.1 K/uL    RBC 3.97 (L) 4.23 - 5.67 M/uL    HGB 11.9 (L) 13.6 - 17.2 g/dL    HCT 34.2 (L) 41.1 - 50.3 %    MCV 86.1 79.6 - 97.8 FL    MCH 30.0 26.1 - 32.9 PG    MCHC 34.8 31.4 - 35.0 g/dL    RDW 12.0 11.9 - 14.6 %    PLATELET 913 471 - 053 K/uL    MPV 9.7 (L) 10.8 - 14.1 FL    DF AUTOMATED      NEUTROPHILS 63 43 - 78 %    LYMPHOCYTES 30 13 - 44 %    MONOCYTES 5 4.0 - 12.0 %    EOSINOPHILS 2 0.5 - 7.8 %    BASOPHILS 0 0.0 - 2.0 %    IMMATURE GRANULOCYTES 0 0.0 - 5.0 %    ABS. NEUTROPHILS 6.8 1.7 - 8.2 K/UL    ABS. LYMPHOCYTES 3.3 0.5 - 4.6 K/UL    ABS. MONOCYTES 0.6 0.1 - 1.3 K/UL    ABS. EOSINOPHILS 0.2 0.0 - 0.8 K/UL    ABS. BASOPHILS 0.0 0.0 - 0.2 K/UL    ABS. IMM. GRANS. 0.0 0.0 - 0.5 K/UL   METABOLIC PANEL, COMPREHENSIVE    Collection Time: 05/13/18 11:11 PM   Result Value Ref Range    Sodium 141 136 - 145 mmol/L    Potassium 3.8 3.5 - 5.1 mmol/L    Chloride 103 98 - 107 mmol/L    CO2 25 21 - 32 mmol/L    Anion gap 13 7 - 16 mmol/L    Glucose 144 (H) 65 - 100 mg/dL    BUN 54 (H) 6 - 23 MG/DL    Creatinine 1.15 0.8 - 1.5 MG/DL    GFR est AA >60 >60 ml/min/1.73m2    GFR est non-AA >60 >60 ml/min/1.73m2    Calcium 8.4 8.3 - 10.4 MG/DL    Bilirubin, total 0.5 0.2 - 1.1 MG/DL    ALT (SGPT) 60 12 - 65 U/L    AST (SGOT) 22 15 - 37 U/L    Alk. phosphatase 66 50 - 136 U/L    Protein, total 6.8 6.3 - 8.2 g/dL    Albumin 3.8 3.5 - 5.0 g/dL    Globulin 3.0 2.3 - 3.5 g/dL    A-G Ratio 1.3 1.2 - 3.5     PROTHROMBIN TIME + INR    Collection Time: 05/13/18 11:11 PM   Result Value Ref Range    Prothrombin time 13.2 11.5 - 14.5 sec    INR 1.0     LIPASE    Collection Time: 05/13/18 11:11 PM   Result Value Ref Range    Lipase 81 73 - 393 U/L          Farhan Parrish MD; 5/14/2018 @12:07 AM Voice dictation software was used during the making of this note. This software is not perfect and grammatical and other typographical errors may be present.   This note has not been proofread for errors.  ===================================================================

## 2018-05-14 NOTE — ANESTHESIA PREPROCEDURE EVALUATION
Anesthetic History     PONV          Review of Systems / Medical History  Patient summary reviewed    Pulmonary            Asthma        Neuro/Psych              Cardiovascular                  Exercise tolerance: >4 METS     GI/Hepatic/Renal     GERD           Endo/Other             Other Findings   Comments: GI bleed         Physical Exam    Airway    TM Distance: > 6 cm  Neck ROM: normal range of motion        Cardiovascular               Dental  No notable dental hx       Pulmonary  Breath sounds clear to auscultation               Abdominal         Other Findings            Anesthetic Plan    ASA: 2  Anesthesia type: total IV anesthesia            Anesthetic plan and risks discussed with: Patient

## 2018-05-14 NOTE — PROGRESS NOTES
TRANSFER - IN REPORT:    Verbal report received from Nicho Price RN(name) on Bryson Hui  being received from ED(unit) for routine progression of care      Report consisted of patients Situation, Background, Assessment and   Recommendations(SBAR). Information from the following report(s) SBAR, Kardex, ED Summary, Intake/Output and Recent Results was reviewed with the receiving nurse. Opportunity for questions and clarification was provided. Assessment completed upon patients arrival to unit and care assumed.

## 2018-05-14 NOTE — ED NOTES
TRANSFER - OUT REPORT:    Verbal report given to Favio Contreras RN (name) on Manju Dumont  being transferred to 359(unit) for routine progression of care       Report consisted of patients Situation, Background, Assessment and   Recommendations(SBAR). Information from the following report(s) ED Summary was reviewed with the receiving nurse. Lines:   Peripheral IV 05/13/18 Right Antecubital (Active)   Site Assessment Clean, dry, & intact 5/13/2018 11:22 PM   Phlebitis Assessment 0 5/13/2018 11:22 PM   Infiltration Assessment 0 5/13/2018 11:22 PM   Dressing Status Clean, dry, & intact 5/13/2018 11:22 PM   Hub Color/Line Status Green 5/13/2018 11:22 PM        Opportunity for questions and clarification was provided.       Patient transported with:   Registered Nurse

## 2018-05-14 NOTE — PROGRESS NOTES
Primary Nurse Joce Saucedo and Mihir Monahan RN performed a dual skin assessment on this patient No impairment noted  Dillon score is 21

## 2018-05-14 NOTE — ED NOTES
IV placed w/ pt having panic attack causing him to have a vagal response AEB severe diaphoresis and increased paleness. Pt calmed w/ VSS, will cont to monitor.

## 2018-05-14 NOTE — PROGRESS NOTES
Problem: Interdisciplinary Rounds  Goal: Interdisciplinary Rounds  Interdisciplinary team rounds were held 5/14/2018 with the following team members:Care Management, Nursing, Nutrition, Pharmacy, Physical Therapy and Physician. Plan of care discussed. See clinical pathway and/or care plan for interventions and desired outcomes.

## 2018-05-14 NOTE — PROGRESS NOTES
Dr. Samia Mancuso called to request urgent EGD at [facility]. I notified Galina Gun and Pre-Op and I asked the floor nurse to transport pt to Pre-op area so we could do the pt next.

## 2018-05-15 VITALS
RESPIRATION RATE: 15 BRPM | SYSTOLIC BLOOD PRESSURE: 111 MMHG | TEMPERATURE: 97.8 F | DIASTOLIC BLOOD PRESSURE: 63 MMHG | HEART RATE: 74 BPM | OXYGEN SATURATION: 97 %

## 2018-05-15 LAB
ANION GAP SERPL CALC-SCNC: 3 MMOL/L (ref 7–16)
BUN SERPL-MCNC: 18 MG/DL (ref 6–23)
CALCIUM SERPL-MCNC: 8.1 MG/DL (ref 8.3–10.4)
CHLORIDE SERPL-SCNC: 109 MMOL/L (ref 98–107)
CO2 SERPL-SCNC: 30 MMOL/L (ref 21–32)
CREAT SERPL-MCNC: 0.98 MG/DL (ref 0.8–1.5)
GLUCOSE SERPL-MCNC: 92 MG/DL (ref 65–100)
HCT VFR BLD AUTO: 23.8 % (ref 41.1–50.3)
HGB BLD-MCNC: 8.1 G/DL (ref 13.6–17.2)
POTASSIUM SERPL-SCNC: 3.9 MMOL/L (ref 3.5–5.1)
SODIUM SERPL-SCNC: 142 MMOL/L (ref 136–145)

## 2018-05-15 PROCEDURE — 74011000250 HC RX REV CODE- 250: Performed by: INTERNAL MEDICINE

## 2018-05-15 PROCEDURE — 74011250636 HC RX REV CODE- 250/636: Performed by: INTERNAL MEDICINE

## 2018-05-15 PROCEDURE — 80048 BASIC METABOLIC PNL TOTAL CA: CPT | Performed by: INTERNAL MEDICINE

## 2018-05-15 PROCEDURE — 85014 HEMATOCRIT: CPT | Performed by: INTERNAL MEDICINE

## 2018-05-15 PROCEDURE — 36415 COLL VENOUS BLD VENIPUNCTURE: CPT | Performed by: INTERNAL MEDICINE

## 2018-05-15 PROCEDURE — 99218 HC RM OBSERVATION: CPT

## 2018-05-15 PROCEDURE — C9113 INJ PANTOPRAZOLE SODIUM, VIA: HCPCS | Performed by: INTERNAL MEDICINE

## 2018-05-15 PROCEDURE — 96376 TX/PRO/DX INJ SAME DRUG ADON: CPT

## 2018-05-15 RX ORDER — PANTOPRAZOLE SODIUM 40 MG/1
40 TABLET, DELAYED RELEASE ORAL DAILY
Qty: 30 TAB | Refills: 0 | Status: SHIPPED | OUTPATIENT
Start: 2018-05-15 | End: 2018-06-14

## 2018-05-15 RX ADMIN — SODIUM CHLORIDE 100 ML/HR: 900 INJECTION, SOLUTION INTRAVENOUS at 00:41

## 2018-05-15 RX ADMIN — SODIUM CHLORIDE 40 MG: 9 INJECTION, SOLUTION INTRAMUSCULAR; INTRAVENOUS; SUBCUTANEOUS at 08:14

## 2018-05-15 NOTE — DISCHARGE INSTRUCTIONS
DISCHARGE SUMMARY from Nurse    The following personal items are in your possession at time of discharge:    Dental Appliances: None        Home Medications: None  Jewelry: None  Clothing: None  Other Valuables: Cell Phone             PATIENT INSTRUCTIONS:    After general anesthesia or intravenous sedation, for 24 hours or while taking prescription Narcotics:  · Limit your activities  · Do not drive and operate hazardous machinery  · Do not make important personal or business decisions  · Do  not drink alcoholic beverages  · If you have not urinated within 8 hours after discharge, please contact your surgeon on call. Report the following to your surgeon:  · Excessive pain, swelling, redness or odor of or around the surgical area  · Temperature over 100.5  · Nausea and vomiting lasting longer than 4 hours or if unable to take medications  · Any signs of decreased circulation or nerve impairment to extremity: change in color, persistent  numbness, tingling, coldness or increase pain  · Any questions        What to do at Home:  Recommended activity: Activity as tolerated, per MD    If you experience any of the following symptoms fever>101, pain unrelieved with medication, nausea/vomiting, shortness of breath, dizziness/fainting, chest pain. , please follow up with your doctor. *  Please give a list of your current medications to your Primary Care Provider. *  Please update this list whenever your medications are discontinued, doses are      changed, or new medications (including over-the-counter products) are added. *  Please carry medication information at all times in case of emergency situations. These are general instructions for a healthy lifestyle:    No smoking/ No tobacco products/ Avoid exposure to second hand smoke    Surgeon General's Warning:  Quitting smoking now greatly reduces serious risk to your health.     Obesity, smoking, and sedentary lifestyle greatly increases your risk for illness    A healthy diet, regular physical exercise & weight monitoring are important for maintaining a healthy lifestyle    You may be retaining fluid if you have a history of heart failure or if you experience any of the following symptoms:  Weight gain of 3 pounds or more overnight or 5 pounds in a week, increased swelling in our hands or feet or shortness of breath while lying flat in bed. Please call your doctor as soon as you notice any of these symptoms; do not wait until your next office visit. Recognize signs and symptoms of STROKE:    F-face looks uneven    A-arms unable to move or move unevenly    S-speech slurred or non-existent    T-time-call 911 as soon as signs and symptoms begin-DO NOT go       Back to bed or wait to see if you get better-TIME IS BRAIN. Warning Signs of HEART ATTACK     Call 911 if you have these symptoms:   Chest discomfort. Most heart attacks involve discomfort in the center of the chest that lasts more than a few minutes, or that goes away and comes back. It can feel like uncomfortable pressure, squeezing, fullness, or pain.  Discomfort in other areas of the upper body. Symptoms can include pain or discomfort in one or both arms, the back, neck, jaw, or stomach.  Shortness of breath with or without chest discomfort.  Other signs may include breaking out in a cold sweat, nausea, or lightheadedness. Don't wait more than five minutes to call 911 - MINUTES MATTER! Fast action can save your life. Calling 911 is almost always the fastest way to get lifesaving treatment. Emergency Medical Services staff can begin treatment when they arrive -- up to an hour sooner than if someone gets to the hospital by car. The discharge information has been reviewed with the patient. The patient verbalized understanding. Discharge medications reviewed with the patient and appropriate educational materials and side effects teaching were provided.                Upper GI Endoscopy: Before Your Procedure  What is an upper GI endoscopy? An upper gastrointestinal (or GI) endoscopy is a test that allows your doctor to look at the inside of your esophagus, stomach, and the first part of your small intestine, called the duodenum. The esophagus is the tube that carries food to your stomach. The doctor uses a thin, lighted tube that bends. It is called an endoscope, or scope. The doctor puts the tip of the scope in your mouth and gently moves it down your throat. The scope is a flexible video camera. The doctor looks at a monitor (like a TV set or a computer screen) as he or she moves the scope. A doctor may do this test, which is also called a procedure, to look for ulcers, tumors, infection, or bleeding. It also can be used to look for signs of acid backing up into your esophagus. This is called gastroesophageal reflux disease, or GERD. The doctor can use the scope to take a sample of tissue for study (a biopsy). The doctor also can use the scope to take out growths or stop bleeding. Follow-up care is a key part of your treatment and safety. Be sure to make and go to all appointments, and call your doctor if you are having problems. It's also a good idea to know your test results and keep a list of the medicines you take. What happens before the procedure? ?Preparing for the procedure  ? · Understand exactly what procedure is planned, along with the risks, benefits, and other options. · Tell your doctors ALL the medicines, vitamins, supplements, and herbal remedies you take. Some of these can increase the risk of bleeding or interact with anesthesia. ? · If you take blood thinners, such as warfarin (Coumadin), clopidogrel (Plavix), or aspirin, be sure to talk to your doctor. He or she will tell you if you should stop taking these medicines before your procedure.  Make sure that you understand exactly what your doctor wants you to do.   ? · Your doctor will tell you which medicines to take or stop before your procedure. You may need to stop taking certain medicines a week or more before the procedure. So talk to your doctor as soon as you can.   ? · If you have an advance directive, let your doctor know. It may include a living will and a durable power of  for health care. Bring a copy to the hospital. If you don't have one, you may want to prepare one. It lets your doctor and loved ones know your health care wishes. Doctors advise that everyone prepare these papers before any type of surgery or procedure. Procedures can be stressful. This information will help you understand what you can expect. And it will help you safely prepare for your procedure. What happens on the day of the procedure? · Follow the instructions exactly about when to stop eating and drinking. If you don't, your procedure may be canceled. If your doctor told you to take your medicines on the day of the procedure, take them with only a sip of water. ? · Take a bath or shower before you come in for your procedure. Do not apply lotions, perfumes, deodorants, or nail polish. ? · Take off all jewelry and piercings. And take out contact lenses, if you wear them. ? At the hospital or surgery center   · Bring a picture ID. ? · The test may take 15 to 30 minutes. ? · The doctor may spray medicine on the back of your throat to numb it. You also will get medicine to prevent pain and to relax you. ? · You will lie on your left side. The doctor will put the scope in your mouth and toward the back of your throat. The doctor will tell you when to swallow. This helps the scope move down your throat. You will be able to breathe normally. The doctor will move the scope down your esophagus into your stomach. The doctor also may look at the duodenum.    ? · If your doctor wants to take a sample of tissue for a biopsy, he or she may use small surgical tools, which are put into the scope, to cut off some tissue. You will not feel a biopsy, if one is taken. The doctor also can use the tools to stop bleeding or to do other treatments, if needed. ? · You will stay at the hospital or surgery center for 1 to 2 hours until the medicine you were given wears off. Going home   · Be sure you have someone to drive you home. Anesthesia and pain medicine make it unsafe for you to drive. ? · You will be given more specific instructions about recovering from your procedure. They will cover things like diet, wound care, follow-up care, driving, and getting back to your normal routine. When should you call your doctor? · You have questions or concerns. ? · You don't understand how to prepare for your procedure. ? · You become ill before the procedure (such as fever, flu, or a cold). ? · You need to reschedule or have changed your mind about having the procedure. Where can you learn more? Go to http://john-michael.info/. Enter P790 in the search box to learn more about \"Upper GI Endoscopy: Before Your Procedure. \"  Current as of: May 12, 2017  Content Version: 11.4  © 5068-8942 [employer]. Care instructions adapted under license by [employer] (which disclaims liability or warranty for this information). If you have questions about a medical condition or this instruction, always ask your healthcare professional. [address] any warranty or liability for your use of this information.

## 2018-05-15 NOTE — PROGRESS NOTES
Shift assessment complete; pt in bed resting. No c/o pain. Pt states he has not vomited or had a bloody stool since before coming to the hospital. Resp even & unlabored on room air; lungs clear. HR regular. Abdomen soft & non-tender with active bowel sounds. Pt states he is hungry. IVF infusing. Bed low & locked; call light in reach; no needs voiced; will continue to monitor.

## 2018-05-15 NOTE — PROGRESS NOTES
Pt assessment completed. Pt in bed. NAD noted. Call light (phone) and essentials within reach. Pt aware to call with needs. Will monitor.

## 2018-05-15 NOTE — DISCHARGE SUMMARY
Discharge Summary     Patient: Shivam Gonsalez MRN: [id number]  SSN: [id number]    YOB: 1985  Age: 33 y.o. Sex: male       Admit Date: 5/13/2018    Discharge Date: 5/15/2018      Admission Diagnoses: Gastrointestinal hemorrhage, unspecified gastrointestinal hemorrhage type [K92.2]    Discharge Diagnoses:   Problem List as of 5/15/2018  Never Reviewed          Codes Class Noted - Resolved    * (Principal)Upper GI bleed ICD-10-CM: K92.2  ICD-9-CM: 578.9  5/14/2018 - Present        Anxiety ICD-10-CM: F41.9  ICD-9-CM: 300.00  5/14/2018 - Present        ADD (attention deficit disorder) ICD-10-CM: F98.8  ICD-9-CM: 314.00  5/14/2018 - Present        Epigastric abdominal pain ICD-10-CM: R10.13  ICD-9-CM: 789.06  5/14/2018 - Present        Nausea and vomiting ICD-10-CM: R11.2  ICD-9-CM: 787.01  5/14/2018 - Present               Discharge Condition: Stable    Hospital Course:     Shivam Gonsalez is a 89GJS with eosinophilic esophagitis, IgA deficiency who presented to [facility] ED with n/v and melena. Patient was seen by GI consultation. He had EGD on 5/14/2018 and the finding is :  Findings:   OROPHARYNX: The oropharynx was briefly viewed on entry and withdrawal with very brief evaluation of the cords, arytenoids and pyriform sinuses. No abnormalities found.       ESOPHAGUS:  The esophagus was remarkable for horizontal rings and linear furrows c/w EoE but no significant narrowing appreciated. There was an adherent clot over an elliptical ulceration at the EGJ. No apparent chronic esophagitis and the appearance was of a MWT, however he had no recall of retching prior to onset of melena. I removed the clot with a clip and placed two clips, deeply seated, on the base of the clotted area. Small hiatal hernia noted.       STOMACH: The gastric pouch inflated and deflated normally.   The mucosal surface and gastric rugae were normal without erosions, ulcerations, raised lesions, vascular ectasias or other anomalies. The pylorus was patent to passage of the endoscope without difficulty.      DUODENUM:  The endoscope was easily passed into the third section of the duodenum. The villous mucosa was normal without blunting or scalloped folds. There were no mucosal erosions, ulcerations, raised lesions or vascular ectasias. Pre-operative Diagnosis: UGI bleed      Post-operative Diagnosis: Ulceration at GEJ vs MWT, (history inconsistent with the latter)                        Resolution Clip x 2                        Hiatal hernia, small                        Eosinophillic esophagitis.      EBL: 0 cc's. On the day of the discharge patient has been walking in his room. Frutoso Golder He has no dizziness, no lightheadedness. No fever, no nausea. No vomiting. Vital signs are stable. Patient has Hb checked which is stable. He had no melena or bowel movement since admission. I checked with GI service (NP and Dr. Carmine Campo) who agreed with discharge plan. Physical Exam:        General:                    Alert, cooperative, NAD   Eyes:                                   EOMI Anicteric  Head:                                   Normocephalic, without obvious abnormality, atraumatic. Lungs:                       Clear, no wheezing  Heart:                                  Regular rate and rhythm,  no BLE edema  Abdomen:                  Soft, ND, no tenderness, normoactive bowel sound. MSK:                                   Spontaneously moves extremities. No deformities/lesions. Skin:                                    Texture, turgor normal. Not Jaundiced. Neurologic:                Alert and oriented x 3, no focal deficits   Psychiatry:      No depression/anxiety. Mood congruent for context.   Heme/Lymph/Immune: No petechiae, echymoses, overt signs of bleeding or lymphadenopathy.         Consults: Gastroenterology    Significant Diagnostic Studies:     EGD as above    Recent Results (from the past 24 hour(s))   HGB & HCT Collection Time: 05/14/18  3:04 PM   Result Value Ref Range    HGB 9.4 (L) 13.6 - 17.2 g/dL    HCT 27.8 (L) 41.1 - 50.3 %   HGB & HCT    Collection Time: 05/14/18  7:10 PM   Result Value Ref Range    HGB 8.4 (L) 13.6 - 17.2 g/dL    HCT 25.1 (L) 41.1 - 50.3 %   HGB & HCT    Collection Time: 05/15/18  4:38 AM   Result Value Ref Range    HGB 8.1 (L) 13.6 - 17.2 g/dL    HCT 23.8 (L) 41.1 - 11.5 %   METABOLIC PANEL, BASIC    Collection Time: 05/15/18  4:38 AM   Result Value Ref Range    Sodium 142 136 - 145 mmol/L    Potassium 3.9 3.5 - 5.1 mmol/L    Chloride 109 (H) 98 - 107 mmol/L    CO2 30 21 - 32 mmol/L    Anion gap 3 (L) 7 - 16 mmol/L    Glucose 92 65 - 100 mg/dL    BUN 18 6 - 23 MG/DL    Creatinine 0.98 0.8 - 1.5 MG/DL    GFR est AA >60 >60 ml/min/1.73m2    GFR est non-AA >60 >60 ml/min/1.73m2    Calcium 8.1 (L) 8.3 - 10.4 MG/DL     All Micro Results     None            Disposition: home    Discharge Medications:   Current Discharge Medication List      START taking these medications    Details   pantoprazole (PROTONIX) 40 mg tablet Take 1 Tab by mouth daily for 30 days. Qty: 30 Tab, Refills: 0         CONTINUE these medications which have NOT CHANGED    Details   Lisdexamfetamine (VYVANSE) 40 mg capsule Take 40 mg by mouth daily. PARoxetine (PAXIL) 30 mg tablet Take 30 mg by mouth daily. Activity: Activity as tolerated  Diet: Clear liquids, advance as tolerated  Wound Care: None needed    Follow-up Appointments   Procedures    FOLLOW UP VISIT Appointment in: 3 - 5 Days See primary doctor     See primary doctor     Standing Status:   Standing     Number of Occurrences:   1     Order Specific Question:   Appointment in     Answer:   3 - 5 Days       I have discussed the plan of care with patient. Time spent on discharge is 35 minutes.        Signed By: Ambreen Kennedy MD     May 15, 2018

## 2018-05-15 NOTE — PROGRESS NOTES
Visited with patient as no PCP listed in chart. Pt states he do have insurance and a PCP Erica Otero MD). Pt states his insurance is [employer] (sw updated face sheet) but he is behind on insurance payment. Explained the [facility] [address] program in detail and provided patient with resources; if his insurance has canceled. He states he is not interested in the [facility]. Patient also given contact information for [facility] with [employer] and encouraged to call to get screened for Medicaid/Insurance eligibility and/or financial assistance. SW also received a copy of insurance card and gave to registration.     DCR

## 2018-08-23 ENCOUNTER — HOSPITAL ENCOUNTER (OUTPATIENT)
Dept: LAB | Age: 33
Discharge: HOME | End: 2018-08-23

## 2018-08-23 PROCEDURE — 88305 TISSUE EXAM BY PATHOLOGIST: CPT
